# Patient Record
Sex: MALE | Race: BLACK OR AFRICAN AMERICAN
[De-identification: names, ages, dates, MRNs, and addresses within clinical notes are randomized per-mention and may not be internally consistent; named-entity substitution may affect disease eponyms.]

---

## 2020-05-12 ENCOUNTER — HOSPITAL ENCOUNTER (EMERGENCY)
Dept: HOSPITAL 35 - ER | Age: 40
Discharge: HOME | End: 2020-05-12
Payer: COMMERCIAL

## 2020-05-12 VITALS — BODY MASS INDEX: 32.98 KG/M2 | HEIGHT: 78 IN | WEIGHT: 285.01 LBS

## 2020-05-12 VITALS — DIASTOLIC BLOOD PRESSURE: 92 MMHG | SYSTOLIC BLOOD PRESSURE: 150 MMHG

## 2020-05-12 DIAGNOSIS — R07.9: ICD-10-CM

## 2020-05-12 DIAGNOSIS — I10: ICD-10-CM

## 2020-05-12 DIAGNOSIS — M25.512: ICD-10-CM

## 2020-05-12 DIAGNOSIS — F17.210: ICD-10-CM

## 2020-05-12 DIAGNOSIS — M54.12: Primary | ICD-10-CM

## 2020-05-12 LAB
ANION GAP SERPL CALC-SCNC: 11 MMOL/L (ref 7–16)
BASOPHILS NFR BLD AUTO: 0.7 % (ref 0–2)
BUN SERPL-MCNC: 13 MG/DL (ref 7–18)
CALCIUM SERPL-MCNC: 9.3 MG/DL (ref 8.5–10.1)
CHLORIDE SERPL-SCNC: 102 MMOL/L (ref 98–107)
CO2 SERPL-SCNC: 26 MMOL/L (ref 21–32)
CREAT SERPL-MCNC: 1.2 MG/DL (ref 0.7–1.3)
EOSINOPHIL NFR BLD: 3.3 % (ref 0–3)
ERYTHROCYTE [DISTWIDTH] IN BLOOD BY AUTOMATED COUNT: 14.5 % (ref 10.5–14.5)
GLUCOSE SERPL-MCNC: 108 MG/DL (ref 74–106)
GRANULOCYTES NFR BLD MANUAL: 63.9 % (ref 36–66)
HCT VFR BLD CALC: 46.7 % (ref 42–52)
HGB BLD-MCNC: 15.5 GM/DL (ref 14–18)
LYMPHOCYTES NFR BLD AUTO: 22.6 % (ref 24–44)
MCH RBC QN AUTO: 28.2 PG (ref 26–34)
MCHC RBC AUTO-ENTMCNC: 33.2 G/DL (ref 28–37)
MCV RBC: 84.8 FL (ref 80–100)
MONOCYTES NFR BLD: 9.5 % (ref 1–8)
NEUTROPHILS # BLD: 4.1 THOU/UL (ref 1.4–8.2)
PLATELET # BLD: 200 THOU/UL (ref 150–400)
POTASSIUM SERPL-SCNC: 4 MMOL/L (ref 3.5–5.1)
RBC # BLD AUTO: 5.5 MIL/UL (ref 4.5–6)
SODIUM SERPL-SCNC: 139 MMOL/L (ref 136–145)
TROPONIN I SERPL-MCNC: <0.06 NG/ML (ref ?–0.06)
WBC # BLD AUTO: 6.4 THOU/UL (ref 4–11)
